# Patient Record
Sex: MALE | Race: OTHER | HISPANIC OR LATINO
[De-identification: names, ages, dates, MRNs, and addresses within clinical notes are randomized per-mention and may not be internally consistent; named-entity substitution may affect disease eponyms.]

---

## 2018-04-20 PROBLEM — Z00.00 ENCOUNTER FOR PREVENTIVE HEALTH EXAMINATION: Noted: 2018-04-20

## 2018-04-23 ENCOUNTER — OTHER (OUTPATIENT)
Age: 53
End: 2018-04-23

## 2018-05-03 ENCOUNTER — APPOINTMENT (OUTPATIENT)
Dept: ORTHOPEDIC SURGERY | Facility: CLINIC | Age: 53
End: 2018-05-03

## 2018-05-09 ENCOUNTER — OTHER (OUTPATIENT)
Age: 53
End: 2018-05-09

## 2018-05-18 ENCOUNTER — APPOINTMENT (OUTPATIENT)
Dept: ORTHOPEDIC SURGERY | Facility: CLINIC | Age: 53
End: 2018-05-18
Payer: MEDICAID

## 2018-05-18 VITALS
SYSTOLIC BLOOD PRESSURE: 160 MMHG | HEIGHT: 60 IN | HEART RATE: 72 BPM | DIASTOLIC BLOOD PRESSURE: 91 MMHG | WEIGHT: 150 LBS | BODY MASS INDEX: 29.45 KG/M2

## 2018-05-18 PROCEDURE — 72100 X-RAY EXAM L-S SPINE 2/3 VWS: CPT

## 2018-05-18 PROCEDURE — 99204 OFFICE O/P NEW MOD 45 MIN: CPT

## 2018-05-29 ENCOUNTER — OUTPATIENT (OUTPATIENT)
Dept: OUTPATIENT SERVICES | Facility: HOSPITAL | Age: 53
LOS: 1 days | End: 2018-05-29
Payer: MEDICAID

## 2018-05-29 ENCOUNTER — APPOINTMENT (OUTPATIENT)
Dept: MRI IMAGING | Facility: CLINIC | Age: 53
End: 2018-05-29
Payer: COMMERCIAL

## 2018-05-29 DIAGNOSIS — Z00.8 ENCOUNTER FOR OTHER GENERAL EXAMINATION: ICD-10-CM

## 2018-05-29 PROCEDURE — 72148 MRI LUMBAR SPINE W/O DYE: CPT

## 2018-05-29 PROCEDURE — 72148 MRI LUMBAR SPINE W/O DYE: CPT | Mod: 26

## 2018-06-06 ENCOUNTER — RECORD ABSTRACTING (OUTPATIENT)
Age: 53
End: 2018-06-06

## 2018-06-12 ENCOUNTER — APPOINTMENT (OUTPATIENT)
Dept: ORTHOPEDIC SURGERY | Facility: CLINIC | Age: 53
End: 2018-06-12
Payer: COMMERCIAL

## 2018-06-12 VITALS
SYSTOLIC BLOOD PRESSURE: 157 MMHG | HEIGHT: 60 IN | HEART RATE: 72 BPM | DIASTOLIC BLOOD PRESSURE: 92 MMHG | BODY MASS INDEX: 29.45 KG/M2 | WEIGHT: 150 LBS

## 2018-06-12 DIAGNOSIS — M48.062 SPINAL STENOSIS, LUMBAR REGION WITH NEUROGENIC CLAUDICATION: ICD-10-CM

## 2018-06-12 DIAGNOSIS — M47.816 SPONDYLOSIS W/OUT MYELOPATHY OR RADICULOPATHY, LUMBAR REGION: ICD-10-CM

## 2018-06-12 DIAGNOSIS — Z78.9 OTHER SPECIFIED HEALTH STATUS: ICD-10-CM

## 2018-06-12 DIAGNOSIS — M43.16 SPONDYLOLISTHESIS, LUMBAR REGION: ICD-10-CM

## 2018-06-12 DIAGNOSIS — F17.200 NICOTINE DEPENDENCE, UNSPECIFIED, UNCOMPLICATED: ICD-10-CM

## 2018-06-12 DIAGNOSIS — F17.210 NICOTINE DEPENDENCE, CIGARETTES, UNCOMPLICATED: ICD-10-CM

## 2018-06-12 DIAGNOSIS — Z82.61 FAMILY HISTORY OF ARTHRITIS: ICD-10-CM

## 2018-06-12 PROCEDURE — 99214 OFFICE O/P EST MOD 30 MIN: CPT

## 2018-06-18 ENCOUNTER — OUTPATIENT (OUTPATIENT)
Dept: OUTPATIENT SERVICES | Facility: HOSPITAL | Age: 53
LOS: 1 days | End: 2018-06-18
Payer: MEDICAID

## 2018-06-18 DIAGNOSIS — M43.16 SPONDYLOLISTHESIS, LUMBAR REGION: ICD-10-CM

## 2018-06-18 DIAGNOSIS — Z51.89 ENCOUNTER FOR OTHER SPECIFIED AFTERCARE: ICD-10-CM

## 2018-06-18 DIAGNOSIS — M54.5 LOW BACK PAIN: ICD-10-CM

## 2018-06-18 DIAGNOSIS — M47.816 SPONDYLOSIS WITHOUT MYELOPATHY OR RADICULOPATHY, LUMBAR REGION: ICD-10-CM

## 2018-06-26 PROBLEM — F17.210 SMOKES 1 PACK OF CIGARETTES PER DAY: Status: ACTIVE | Noted: 2018-06-06

## 2018-06-26 PROBLEM — Z78.9 DOES NOT USE ILLICIT DRUGS: Status: ACTIVE | Noted: 2018-06-06

## 2018-06-26 PROBLEM — Z78.9 NEVER EXERCISES: Status: ACTIVE | Noted: 2018-06-06

## 2018-06-26 PROBLEM — Z78.9 REGULAR DRINKER OF ALCOHOL: Status: ACTIVE | Noted: 2018-06-06

## 2018-06-26 PROBLEM — Z82.61 FAMILY HISTORY OF ARTHRITIS: Status: ACTIVE | Noted: 2018-06-06

## 2018-06-26 PROBLEM — F17.200 CURRENT EVERY DAY SMOKER: Status: ACTIVE | Noted: 2018-06-06

## 2018-07-17 PROCEDURE — 97110 THERAPEUTIC EXERCISES: CPT

## 2018-07-17 PROCEDURE — 97163 PT EVAL HIGH COMPLEX 45 MIN: CPT

## 2018-07-17 PROCEDURE — 97010 HOT OR COLD PACKS THERAPY: CPT

## 2018-07-17 PROCEDURE — 97140 MANUAL THERAPY 1/> REGIONS: CPT

## 2018-08-28 ENCOUNTER — APPOINTMENT (OUTPATIENT)
Dept: NEUROLOGY | Facility: CLINIC | Age: 53
End: 2018-08-28

## 2018-09-07 ENCOUNTER — OTHER (OUTPATIENT)
Age: 53
End: 2018-09-07

## 2020-04-11 ENCOUNTER — EMERGENCY (EMERGENCY)
Facility: HOSPITAL | Age: 55
LOS: 1 days | Discharge: HOME | End: 2020-04-11
Attending: EMERGENCY MEDICINE
Payer: MEDICAID

## 2020-04-11 VITALS
HEIGHT: 60 IN | OXYGEN SATURATION: 98 % | DIASTOLIC BLOOD PRESSURE: 80 MMHG | RESPIRATION RATE: 20 BRPM | WEIGHT: 154.98 LBS | HEART RATE: 110 BPM | SYSTOLIC BLOOD PRESSURE: 137 MMHG | TEMPERATURE: 98 F

## 2020-04-11 VITALS
HEART RATE: 102 BPM | DIASTOLIC BLOOD PRESSURE: 95 MMHG | SYSTOLIC BLOOD PRESSURE: 142 MMHG | OXYGEN SATURATION: 96 % | RESPIRATION RATE: 20 BRPM | TEMPERATURE: 99 F

## 2020-04-11 LAB — SARS-COV-2 RNA SPEC QL NAA+PROBE: DETECTED

## 2020-04-11 PROCEDURE — 82962 GLUCOSE BLOOD TEST: CPT

## 2020-04-11 PROCEDURE — 99284 EMERGENCY DEPT VISIT MOD MDM: CPT

## 2020-04-11 PROCEDURE — 93010 ELECTROCARDIOGRAM REPORT: CPT

## 2020-04-11 PROCEDURE — 87635 SARS-COV-2 COVID-19 AMP PRB: CPT

## 2020-04-11 PROCEDURE — 93005 ELECTROCARDIOGRAM TRACING: CPT

## 2020-04-11 RX ORDER — AMLODIPINE BESYLATE 2.5 MG/1
1 TABLET ORAL
Qty: 30 | Refills: 0
Start: 2020-04-11 | End: 2020-05-10

## 2020-04-11 RX ORDER — ATORVASTATIN CALCIUM 80 MG/1
10 TABLET, FILM COATED ORAL AT BEDTIME
Refills: 0 | Status: DISCONTINUED | OUTPATIENT
Start: 2020-04-11 | End: 2020-04-16

## 2020-04-11 RX ORDER — METFORMIN HYDROCHLORIDE 850 MG/1
500 TABLET ORAL
Refills: 0 | Status: DISCONTINUED | OUTPATIENT
Start: 2020-04-11 | End: 2020-04-16

## 2020-04-11 RX ORDER — NICOTINE POLACRILEX 2 MG
1 GUM BUCCAL DAILY
Refills: 0 | Status: DISCONTINUED | OUTPATIENT
Start: 2020-04-11 | End: 2020-04-16

## 2020-04-11 RX ORDER — ATORVASTATIN CALCIUM 80 MG/1
1 TABLET, FILM COATED ORAL
Qty: 30 | Refills: 0
Start: 2020-04-11 | End: 2020-05-10

## 2020-04-11 RX ORDER — AMLODIPINE BESYLATE 2.5 MG/1
5 TABLET ORAL DAILY
Refills: 0 | Status: DISCONTINUED | OUTPATIENT
Start: 2020-04-11 | End: 2020-04-16

## 2020-04-11 RX ORDER — METFORMIN HYDROCHLORIDE 850 MG/1
1 TABLET ORAL
Qty: 60 | Refills: 0
Start: 2020-04-11 | End: 2020-05-10

## 2020-04-11 RX ORDER — ACETAMINOPHEN 500 MG
650 TABLET ORAL EVERY 6 HOURS
Refills: 0 | Status: DISCONTINUED | OUTPATIENT
Start: 2020-04-11 | End: 2020-04-16

## 2020-04-11 RX ORDER — ACETAMINOPHEN 500 MG
2 TABLET ORAL
Qty: 40 | Refills: 0
Start: 2020-04-11 | End: 2020-04-15

## 2020-04-11 RX ADMIN — Medication 650 MILLIGRAM(S): at 20:43

## 2020-04-11 RX ADMIN — ATORVASTATIN CALCIUM 10 MILLIGRAM(S): 80 TABLET, FILM COATED ORAL at 22:19

## 2020-04-11 RX ADMIN — Medication 1 PATCH: at 22:20

## 2020-04-11 NOTE — ED ADULT NURSE NOTE - NSIMPLEMENTINTERV_GEN_ALL_ED
Implemented All Universal Safety Interventions:  [place] to call system. Call bell, personal items and telephone within reach. Instruct patient to call for assistance. Room bathroom lighting operational. Non-slip footwear when patient is off stretcher. Physically safe environment: no spills, clutter or unnecessary equipment. Stretcher in lowest position, wheels locked, appropriate side rails in place.

## 2020-04-11 NOTE — ED CDU PROVIDER INITIAL DAY NOTE - MEDICAL DECISION MAKING DETAILS
male recently incarcerated + covid no place for stay.   placed into observation for social work for placement

## 2020-04-11 NOTE — ED STATDOCS - ATTENDING CONTRIBUTION TO CARE
I, Bety Oquendo, performed the initial face to face bedside interview with this patient regarding history of present illness, review of symptoms and relevant past medical, social and family history.  I completed an independent physical examination.  I was the initial provider who evaluated this patient. I have signed out the follow up of any pending tests (i.e. labs, radiological studies) to the ACP.  I have communicated the patient’s plan of care and disposition with the ACP.

## 2020-04-11 NOTE — ED ADULT NURSE REASSESSMENT NOTE - NS ED NURSE REASSESS COMMENT FT1
received pt from previous Rn Prakash Richardson.  pt moved to A2- for isolation. temp-101.2, no apparent distress noted. MD Bety Oquendo made aware and per MD will place medication orders.

## 2020-04-11 NOTE — ED ADULT NURSE NOTE - OBJECTIVE STATEMENT
55 years old male presents to ED for medical evaluation. Patient recently released from [facility]. States tested for covid 1 week ago, didn't receive results of the test. Patient noted with intermittent cough for few weeks. Denies any other symptoms. States since release patient have been living in box truck, no heat. States able to live with family if covid test comes back negative. Compliant with meds while incarcerated. Patient A&Ox4. no s/s of acute distress. Patient pending covid swab results and SW for possible placement.

## 2020-04-11 NOTE — ED STATDOCS - CLINICAL SUMMARY MEDICAL DECISION MAKING FREE TEXT BOX
Will send COVID swab. Social work contacted. Will refill medications, check finger stick Will send COVID swab. Social work contacted. Will refill medications, check finger stick--if covid neg will dc as notes that has family to go to if covid + will need to be placed in a shelter; social work contacted

## 2020-04-11 NOTE — ED CDU PROVIDER INITIAL DAY NOTE - OBJECTIVE STATEMENT
56 yo male hx of htn niddm recently incarcerated for several months released about 4 days ago found to be covid +, seeking shelter since place where pt was staying is refusing to take him back. denies complaints at this time. compliant with all medications prescribed. denies fevers chills + cough - cp -sob - GI symptoms   smoker

## 2020-04-11 NOTE — ED STATDOCS - OBJECTIVE STATEMENT
54 y/o M pt with significant PMHx of HTN, DM and HLD presents to the ED c/o medication refill and housing. He reports that he was recently discharged from corrections 4 days ago, and is currently seeking shelter. Patient states that he may possibly be positive for COVID. He also recalls that he had a cough several days ago, but it has subsided. Patient currently has no symptoms. Denies f/c/n/v/cp/sob/palpitations/ cough/rash/headache/dizziness/abd.pain/d/c/dysuria/hematuria No further acute complaints at this time. 56 y/o M pt with significant PMHx of HTN, DM and HLD presents to the ED c/o medication refill and housing. He reports that he was recently discharged from corrections 4 days ago, and is currently seeking shelter. Patient states that he may possibly be positive for COVID was tested at [facility]. but doesn't know the results. He also recalls that he had a cough several days ago, but it has subsided. Patient currently has no symptoms. Denies f/c/n/v/cp/sob/palpitations/ cough/rash/headache/dizziness/abd.pain/d/c/dysuria/hematuria No further acute complaints at this time.

## 2020-04-11 NOTE — CHART NOTE - NSCHARTNOTEFT_GEN_A_CORE
18:55SWNote: p/c from pt's EDMD(Aniyah) to inform worker that pt is +Covid and informed her that he is homeless. Reportedly, pt was staying at friend's apartment ,pt was incarcerated seven months due to DWI released this Wednesday. States that he went to the friend's apartment and he didn't allow him to enter because he was experiencing Covid symptoms (coughing and chills) ,pt slept outside next to a trailer truck these past two nights. Pt +Covid (tested at correction office last Tuesday).   Worker called DSS after hours(Don) to request shelter for pt, per Alexander pt shows he lives in [place] and receives SNAP at same address.Per Alexander, pt has to return to that residence. Worker explained pt informed his friends are not allowing him in, there are children and other adults in the apartment. Per Alexander, pt can call the police if he does not have an eviction order he has to be able to get in. Worker informed Dr Jere MD spoke with DSS representative  Don encouraged him to understand pt has been away for 7 months (incarcerated) friends are not obligated to expose the family to Covid. Per Alexander he will discuss case with his administration ,worker to call back at 22:30.   21:17 SWNote-p/c from Alexander to request info about pt's living conditions (# of people living in the house,# of rooms,bathrooms,cleaning supplies,first day of presenting symptoms, date of + results, sex offender or not). Worker met with pt . Pt states its an apartment not house,he was staying in small living room, two bedrooms ,one bathroom, 3 adults,5 children,no sex offender, pt unsure about cleaning supplies has not been in the apartment for 7 months. Pt was tested last Tuesday and he started to experience cough around two weeks ago.  Alexander will discuss case with his , worker will be called. NOREEN to follow.

## 2020-04-11 NOTE — ED ADULT NURSE REASSESSMENT NOTE - NS ED NURSE REASSESS COMMENT FT1
pt stated " I can't take Tylenol/ Motrin  because someone told me I have cholesterol problems, I do not exactly remember why I can't take it." denies any allergies to mediation. MD Oquendo made aware and per MD will go talk with pt.

## 2020-04-11 NOTE — ED ADULT TRIAGE NOTE - CHIEF COMPLAINT QUOTE
c/o wants his sugar checked, ran out of meds. also positive  for corona virus  , was released from shelter 4   days ago

## 2020-04-11 NOTE — ED STATDOCS - PROGRESS NOTE DETAILS
will place in obs for shelter placement; pt incarcerated for 7 months from september up until this wednesday - got COVID while incarcerated now has no place to go ; social work trying to get shelter

## 2020-04-11 NOTE — ED STATDOCS - PHYSICAL EXAMINATION
Head: atraumatic, normacephalic  Face: atraumatic, no crepitus no orbiral/maxillary/mandibular ttp  throat: uvula midline no exudates  eyes: perrla eomi  heart: rrr s1s2  lungs: ctab  abd: soft, nt nd +bs no rebound/guarding no cva ttp  skin: warm  LE: no swelling, no calf ttp  back: no midline cervical/thoracic/lumbar ttp Head: atraumatic, normacephalic  eyes: perrla eomi  heart: rrr s1s2  lungs: ctab  abd: soft, nt nd +bs no rebound/guarding no cva ttp  skin: warm  LE: no swelling, no calf ttp  back: no midline cervical/thoracic/lumbar ttp

## 2020-04-11 NOTE — ED STATDOCS - PATIENT PORTAL LINK FT
You can access the FollowMyHealth Patient Portal offered by [facility] by registering at the following website: [email or web address]. By joining [employer]’s FollowMyHealth portal, you will also be able to view your health information using other applications (apps) compatible with our system.

## 2020-04-11 NOTE — ED ADULT NURSE NOTE - CHIEF COMPLAINT QUOTE
c/o wants his sugar checked, ran out of meds. also positive  for corona virus  , was [facility] 4   days ago

## 2020-04-27 ENCOUNTER — EMERGENCY (EMERGENCY)
Facility: HOSPITAL | Age: 55
LOS: 1 days | Discharge: HOME | End: 2020-04-27
Attending: EMERGENCY MEDICINE
Payer: MEDICAID

## 2020-04-27 VITALS
HEIGHT: 60 IN | RESPIRATION RATE: 20 BRPM | HEART RATE: 94 BPM | OXYGEN SATURATION: 98 % | WEIGHT: 149.91 LBS | SYSTOLIC BLOOD PRESSURE: 132 MMHG | TEMPERATURE: 98 F | DIASTOLIC BLOOD PRESSURE: 81 MMHG

## 2020-04-27 PROCEDURE — 99218: CPT

## 2020-04-27 NOTE — ED PROVIDER NOTE - OBJECTIVE STATEMENT
Pt is a 55y M with PMH of DM/ HTN/ HLD presenting to speak with SW for housing. Pt states he was here 2 weeks ago and tested positive for Covid. He was given housing at a motel for 14 days while he was sick. Today his 14 days were up and he was checked out. He now has no where to go unless he tests negative for covid. Pt denies any symptoms (SOB/fever/cough). No other complaints.

## 2020-04-27 NOTE — ED PROVIDER NOTE - PROGRESS NOTE DETAILS
Will have stat covid test due to change in dispo depending on results.  spoke with supervisor and cannot get expedited covid. SW stating they cannot get housing until the morning anyway and p[t needs covid results. Will place in obs for SW f/u and covid results.

## 2020-04-27 NOTE — CHART NOTE - NSCHARTNOTEFT_GEN_A_CORE
SW Note: SW alerted by RN that pt was staying at [facility] due to being covid + for the last 2 weeks and now needs housing again. SW reviewed previous chart notes, Pt was d/c on 4/11 to [facility] via [facility]. SW met with pt at bedside to assess further. Pt reports his 2 week stay at [facility] via DSS has come to an end and he needs housing again. SW inquired about pt's primary residence and why he cannot return there. Pt reports there are other people that live there, and he is only welcomed back once he is covid negative. Pt reports he sleeps in the living room and shares the bathroom and kitchen with the other people in the house. Pt denied receiving any notices of eviction. SW inquired about pt having private funds for a hotel, which pt denied. SW explained to pt that DSS will make decision to house again or not. Pt's Covid swab pending, SW will follow for d/c to residence/DSS housing pending covid results. PA made aware of above SW Note: SW alerted by RN that pt was staying at [facility] due to being covid + for the last 2 weeks and now needs housing again. SW reviewed previous chart notes, Pt was d/c on 4/11 to [facility] via [facility]. SW met with pt at bedside to assess further. Pt reports his 2 week stay at [facility] via DSS has come to an end and he needs housing again. SW inquired about pt's primary residence and why he cannot return there. Pt was incarcerated and has not been to the residence in 7 months. Pt reports there are other people that live there, and he is only welcomed back once he is covid negative. Pt reports he sleeps in the living room and shares the bathroom and kitchen with the other people in the house. Pt denied receiving any notices of eviction. SW inquired about pt having private funds for a hotel, which pt denied. SW explained to pt that DSS will make decision to house again or not. Pt's Covid swab pending, SW will follow for d/c to residence/DSS housing pending covid results. PA made aware of above

## 2020-04-27 NOTE — ED ADULT NURSE NOTE - OBJECTIVE STATEMENT
pt A&Ox4 in NAD. respirations even and unlabored. moving all extremities. pt presents to ED due to losing housing that was provided due to previous + covid test. pt now requiring repeat covid test for continued placement. pt not reporting any sx at this time. pt resting comfortably.

## 2020-04-27 NOTE — ED PROVIDER NOTE - CLINICAL SUMMARY MEDICAL DECISION MAKING FREE TEXT BOX
55Y M with PMH of DM/HTN/HLD presenting for house after being tested + for covid 2 weeks ago. Pt has housing if covid negative. Will re-test for covid and have SW see pt.

## 2020-04-27 NOTE — ED ADULT NURSE NOTE - NSIMPLEMENTINTERV_GEN_ALL_ED
Implemented All Universal Safety Interventions:  [place] to call system. Call bell, personal items and telephone within reach. Instruct patient to call for assistance. Room bathroom lighting operational. Non-slip footwear when patient is off stretcher. Physically safe environment: no spills, clutter or unnecessary equipment. Stretcher in lowest position, wheels locked, appropriate side rails in place.

## 2020-04-27 NOTE — ED ADULT NURSE NOTE - CHPI ED NUR SYMPTOMS NEG
no wheezing/no body aches/no chest pain/no chills/no shortness of breath/no cough/no diaphoresis/no hemoptysis/no edema/no fever/no headache

## 2020-04-27 NOTE — ED CDU PROVIDER INITIAL DAY NOTE - OBJECTIVE STATEMENT
Pt is a 55Y M with DM HTN HLD presenting to speak with SW for housing s/p + covid test. Pt was seen 2 weeks ago and set up in [facility] pt was + for covid. He was kicked out today after 2 weeks and pt has nowhere to go. He was seen by SW but they need a new covid test to determine where to find housing. Pt denies any fever/SOB/chills/cough. New covid test pending. SW will follow.

## 2020-04-27 NOTE — ED ADULT NURSE NOTE - CHIEF COMPLAINT QUOTE
Pt seen in ED 2 weeks ago and tested + for covid, SW had set up shelter at a mot for pt to quarantine for past 2 weeks, pt told to leave motel today and was told [facility] is unable to help him, NOREEN aware

## 2020-04-27 NOTE — ED ADULT TRIAGE NOTE - CHIEF COMPLAINT QUOTE
Pt seen in ED 2 weeks ago and tested + for covid, SW had set up shelter at a mot for pt to quarantine for past 2 weeks, pt told to leave motel today and was told [facility] is unable to help him, NOREEN aware

## 2020-04-28 VITALS
HEART RATE: 73 BPM | TEMPERATURE: 98 F | DIASTOLIC BLOOD PRESSURE: 80 MMHG | OXYGEN SATURATION: 98 % | SYSTOLIC BLOOD PRESSURE: 123 MMHG | RESPIRATION RATE: 18 BRPM

## 2020-04-28 PROBLEM — E11.9 TYPE 2 DIABETES MELLITUS WITHOUT COMPLICATIONS: Chronic | Status: ACTIVE | Noted: 2020-04-11

## 2020-04-28 LAB
GLUCOSE BLDC GLUCOMTR-MCNC: 212 MG/DL — HIGH (ref 70–99)
SARS-COV-2 RNA SPEC QL NAA+PROBE: DETECTED
SARS-COV-2 RNA SPEC QL NAA+PROBE: DETECTED

## 2020-04-28 PROCEDURE — 87635 SARS-COV-2 COVID-19 AMP PRB: CPT

## 2020-04-28 PROCEDURE — G0378: CPT

## 2020-04-28 PROCEDURE — 99284 EMERGENCY DEPT VISIT MOD MDM: CPT | Mod: 25

## 2020-04-28 PROCEDURE — 82962 GLUCOSE BLOOD TEST: CPT

## 2020-04-28 PROCEDURE — 99217: CPT

## 2020-04-28 NOTE — CHART NOTE - NSCHARTNOTEFT_GEN_A_CORE
SOCIAL WORK NOTE:  AMBULANCE ARRANGE FOR 4 PM. PATIENT MADE AWARE AND PROVIDED PATIENT WITH HOUSING CONFIRMATION FORM.  PATIENT EXPRESSED UNDERSTANDING. ED STAFF AWARE.

## 2020-04-28 NOTE — CHART NOTE - NSCHARTNOTEFT_GEN_A_CORE
SOCIAL WORK NOTE: DISCUSSED WITH [facility] REGARDING PATIENT.  THEY REPORTED THAT HE WAS CLEARED BY THE [employer]IAGO TO NO LONGER NEED ISOLATION. THEY REPORTED HE IS INELIGIBLE FOR HOUSING UNLESS HE RESULTS AS POSITIVE AGAIN.  THIS WORKER SENT THE 2 NEW POSITIVE RESULTS TO [facility]  AND WAS ABLE TO ACQUIRE PLACMENT FOR THE PATIENT AT [facility] LOCATED AT [address] [place] [address]. PATIENT NEEDS TO REPORT DIRECTLY TO ROOM 11 AS DOOR WILL BE OPEN.  PLACEMENT PAPERWORK PROVIDED TO PATIENT AND AMBULANCE TO BE ARRANGED TO TRANSFER PATIENT TO [facility].

## 2020-04-28 NOTE — ED CDU PROVIDER DISPOSITION NOTE - ATTENDING CONTRIBUTION TO CARE
Pt. well appearing. SW found pt. housing. Pt. COVID+19.  Pt. will be discharged from the ED. I, Dr. Shepard, performed a face to face bedside interview with this patient regarding history of present illness, review of symptoms and relevant past medical, social and family history.  I completed an independent physical examination.  I have also reviewed the ACP's note(s) and discussed the plan with the ACP.

## 2020-04-28 NOTE — DISCHARGE NOTE NURSING/CASE MANAGEMENT/SOCIAL WORK - PATIENT PORTAL LINK FT
You can access the FollowMyHealth Patient Portal offered by [facility] by registering at the following website: [email or web address]. By joining [email or web address]’s FollowMyHealth portal, you will also be able to view your health information using other applications (apps) compatible with our system.

## 2020-04-28 NOTE — ED CDU PROVIDER SUBSEQUENT DAY NOTE - ATTENDING CONTRIBUTION TO CARE
recently incarcerated male whom tested + for covid. requiring new housing situation. Pt. stable appearing. Pt. in no distress.. Pt. with normal and steady gait. I, Dr. Shepard, performed a face to face bedside interview with this patient regarding history of present illness, review of symptoms and relevant past medical, social and family history.  I completed an independent physical examination.  I have also reviewed the ACP's note(s) and discussed the plan with the ACP.

## 2020-04-28 NOTE — ED CDU PROVIDER DISPOSITION NOTE - CLINICAL COURSE
This is a 55 year old male with c/o being homeless, was evaluated in ED x 2 weeks ago, and tested +covid, patient reports was evaluated by SW and CM and given medication and follow up with [facility] clinic.  Patient needed repeating testing of covid 19 to determine patient being cohorted.  Patient still covid positive, SW arranged housing in motel.  Stable for discharge home.  Given copies of all results, understands and agrees to proceed.

## 2020-04-28 NOTE — ED CDU PROVIDER DISPOSITION NOTE - PATIENT PORTAL LINK FT
You can access the FollowMyHealth Patient Portal offered by [facility] by registering at the following website: [email or web address]. By joining [email or web address]’s FollowMyHealth portal, you will also be able to view your health information using other applications (apps) compatible with our system.

## 2020-04-28 NOTE — DISCHARGE NOTE NURSING/CASE MANAGEMENT/SOCIAL WORK - NSDCFUADDAPPT_GEN_ALL_CORE_FT
FUNMI CARMEN   5/5 @ 12:30 with Maxine Lisa  [address] ...bring d/c note for follow up MD

## 2020-04-28 NOTE — ED ADULT NURSE REASSESSMENT NOTE - NS ED NURSE REASSESS COMMENT FT1
[place] refused to take pt belongings and per NOREEN sending ambulette with room to accommodate pt belongings. New ride to arrive by 1915. Pt waiting in A 1 [place].
pt moved to A12. Settled in bed with blanket, lights dimmed, TV on. Pt has belongings with him. No other needs at this time.
pt sleeping, awoken for vitals, then went back to sleep, pt stable and comfort measures continue
vitals rechecked, lights remained dimmed and door shut for quiet environment, pt has no other needs at this time
Late Note  Report received at change of shift from outgoing RN and assumed care of pt at that time. Pt received sitting at bedside, awake, alert, no acute distress, no complaints. Pt given breakfast tray and ate with good appetite. Pt ambulated steadily to bathroom without assist. Pt vss, fall precautions in place, will monitor.

## 2020-04-28 NOTE — PROVIDER CONTACT NOTE (OTHER) - ACTION/TREATMENT ORDERED:
[facility] clinic appt made since pt hasn't seen his new PCP yet. Placed appt on D/C notes.

## 2020-04-28 NOTE — CHART NOTE - NSCHARTNOTEFT_GEN_A_CORE
SOCIAL WORK NOTE:  MET WITH PATIENT THIS MORNING AFTER REVIEWING CHART. PATIENT STILL AWAITING COVID UPDATED COVID RESULTS AS NEW RESULTS NEEDED FORM THE 4/11 RESULTS FOR ADDITIONAL PLACEMENT.  PATIENT REPORTED THAT HE DID CALL THE # [phone] NUMBER AS HE WAS DIRECTED TO IN THE MORNING. THIS WORKER REACHED OUT TO HOUSING DEPARTMENT. WILL NEED TO COMPLETE THE COVID TEMPLATE AND SEND ALONG WITH COVID RESULTS IN ORDER FOR PATIENT TO QUALIFY FOR HOUSING. SW WILL CONTINEU TO FOLLOW.

## 2020-05-12 ENCOUNTER — EMERGENCY (EMERGENCY)
Facility: HOSPITAL | Age: 55
LOS: 1 days | Discharge: HOME | End: 2020-05-12
Attending: EMERGENCY MEDICINE
Payer: MEDICAID

## 2020-05-12 VITALS
SYSTOLIC BLOOD PRESSURE: 123 MMHG | HEART RATE: 93 BPM | RESPIRATION RATE: 20 BRPM | DIASTOLIC BLOOD PRESSURE: 81 MMHG | HEIGHT: 60 IN | TEMPERATURE: 98 F | WEIGHT: 149.91 LBS | OXYGEN SATURATION: 99 %

## 2020-05-12 LAB — GLUCOSE BLDC GLUCOMTR-MCNC: 277 MG/DL — HIGH (ref 70–99)

## 2020-05-12 PROCEDURE — 99218: CPT

## 2020-05-12 RX ORDER — METFORMIN HYDROCHLORIDE 850 MG/1
500 TABLET ORAL
Refills: 0 | Status: DISCONTINUED | OUTPATIENT
Start: 2020-05-12 | End: 2020-05-17

## 2020-05-12 RX ORDER — AMLODIPINE BESYLATE 2.5 MG/1
5 TABLET ORAL DAILY
Refills: 0 | Status: DISCONTINUED | OUTPATIENT
Start: 2020-05-12 | End: 2020-05-17

## 2020-05-12 RX ORDER — ATORVASTATIN CALCIUM 80 MG/1
10 TABLET, FILM COATED ORAL AT BEDTIME
Refills: 0 | Status: DISCONTINUED | OUTPATIENT
Start: 2020-05-12 | End: 2020-05-17

## 2020-05-12 RX ADMIN — ATORVASTATIN CALCIUM 10 MILLIGRAM(S): 80 TABLET, FILM COATED ORAL at 23:14

## 2020-05-12 RX ADMIN — METFORMIN HYDROCHLORIDE 500 MILLIGRAM(S): 850 TABLET ORAL at 19:00

## 2020-05-12 NOTE — ED CDU PROVIDER INITIAL DAY NOTE - DETAILS
55 year old male recently incarcerated, currently homeless, COVID swab ordered. CM and SW for placement.

## 2020-05-12 NOTE — ED ADULT NURSE REASSESSMENT NOTE - NS ED NURSE REASSESS COMMENT FT1
Patient resting in stretcher, VSS at this time. No s/s of apparent distress noted. Denies pain or discomfort when asked. Will continue to monitor.

## 2020-05-12 NOTE — ED PROVIDER NOTE - OBJECTIVE STATEMENT
55 year old male with PMhx DM, HTN, HLD presents to the ED for housing placement. Pt reports he was incarcerated until the beginning of April and was then homeless. He then developed sx of Corona Virus and tested positive. Pt has been staying in a motel provided by [facility]. Denies any current complaints, cough, SOB, fever, chills, abd pain, CP, N/V/D. States he needs a COVID swab so he can be placed.

## 2020-05-12 NOTE — ED CDU PROVIDER INITIAL DAY NOTE - FAMILY HISTORY
No pertinent family history in first degree relatives Mother  Still living? Unknown  Family history of hypertension, Age at diagnosis: Age Unknown  Family history of diabetes mellitus, Age at diagnosis: Age Unknown

## 2020-05-12 NOTE — ED CDU PROVIDER INITIAL DAY NOTE - ATTENDING CONTRIBUTION TO CARE
56yo male with DM, HTN, hyperlipidemia presenting to ED to obtain help with housing. Patient recently incarcerated, COVID+ 2 weeks ago but symptoms have mostly recovered since then. Patient states he has a cough but denies fevers/chills/nausea/vomiting/diarrhea. Patient states he needs COVID swab for placement. Will obtain swab, SW aware, likely dc in AM. Soha Bowen DO     I performed a history and physical exam of the patient and discussed their management with the advanced care provider. I reviewed the advanced care provider's note and agree with the documented findings and plan of care. My medical decision making and objective findings are found above.

## 2020-05-12 NOTE — ED ADULT NURSE NOTE - OBJECTIVE STATEMENT
Assumed care at 1633 pt states he was told he needs to be tested for COVID-19 to get placement, pt was positive on 4.27.20, today denies any SOB, couch, fevers, chills. pt has chronic back pain, shoulder pain and neck. Pt took his BP and DM medications today, he is unsure the names of the medications he took.

## 2020-05-12 NOTE — ED ADULT NURSE NOTE - CAS ELECT INFOMATION PROVIDED
DC instructions provided to the patient. Patient in understanding of all dc instructions. N further questions for the RN regarding dc instructions. VSS. Ambulatory. Pending SW for medicaid cab./DC instructions

## 2020-05-12 NOTE — ED ADULT TRIAGE NOTE - CHIEF COMPLAINT QUOTE
Patient arrived to ED today with need for COVID swab to be preformed.  Patient states that  needs a test from him for housing placement.  Patient was COVID positive in April.

## 2020-05-12 NOTE — ED ADULT NURSE REASSESSMENT NOTE - NS ED NURSE REASSESS COMMENT FT1
Report received from off-going RN at 19:30, VSS, pt resting comfortably in stretcher. No s/s of apparent distress noted. Resp even and unlabored, skin warm and dry. Plan of care reviewed with patient, verbalzies understanding at this time, will continue to monitor.

## 2020-05-12 NOTE — ED PROVIDER NOTE - ATTENDING CONTRIBUTION TO CARE
Patient h/o DM, HTN, recently incarcerated, COVID+ 2 weeks ago but symptoms have mostly recovered since then. Patient states he has a cough but denies fevers/chills/nausea/vomiting/diarrhea. Patient states he needs COVID swab for placement. Will obtain swab, SW aware, likely dc in AM. Soha Bowen DO       I performed a history and physical exam of the patient and discussed their management with the advanced care provider. I reviewed the advanced care provider's note and agree with the documented findings and plan of care. My medical decision making and objective findings are found above.

## 2020-05-13 VITALS
SYSTOLIC BLOOD PRESSURE: 122 MMHG | TEMPERATURE: 98 F | DIASTOLIC BLOOD PRESSURE: 76 MMHG | OXYGEN SATURATION: 98 % | HEART RATE: 75 BPM | RESPIRATION RATE: 16 BRPM

## 2020-05-13 LAB
GLUCOSE BLDC GLUCOMTR-MCNC: 226 MG/DL — HIGH (ref 70–99)
SARS-COV-2 RNA SPEC QL NAA+PROBE: SIGNIFICANT CHANGE UP

## 2020-05-13 PROCEDURE — U0003: CPT

## 2020-05-13 PROCEDURE — 99217: CPT

## 2020-05-13 PROCEDURE — 82962 GLUCOSE BLOOD TEST: CPT

## 2020-05-13 PROCEDURE — 99283 EMERGENCY DEPT VISIT LOW MDM: CPT

## 2020-05-13 PROCEDURE — G0378: CPT

## 2020-05-13 RX ORDER — DEXTROSE 50 % IN WATER 50 %
25 SYRINGE (ML) INTRAVENOUS ONCE
Refills: 0 | Status: DISCONTINUED | OUTPATIENT
Start: 2020-05-13 | End: 2020-05-17

## 2020-05-13 RX ORDER — SODIUM CHLORIDE 9 MG/ML
1000 INJECTION, SOLUTION INTRAVENOUS
Refills: 0 | Status: DISCONTINUED | OUTPATIENT
Start: 2020-05-13 | End: 2020-05-17

## 2020-05-13 RX ORDER — DEXTROSE 50 % IN WATER 50 %
12.5 SYRINGE (ML) INTRAVENOUS ONCE
Refills: 0 | Status: DISCONTINUED | OUTPATIENT
Start: 2020-05-13 | End: 2020-05-17

## 2020-05-13 RX ORDER — DEXTROSE 50 % IN WATER 50 %
15 SYRINGE (ML) INTRAVENOUS ONCE
Refills: 0 | Status: DISCONTINUED | OUTPATIENT
Start: 2020-05-13 | End: 2020-05-17

## 2020-05-13 RX ORDER — GLUCAGON INJECTION, SOLUTION 0.5 MG/.1ML
1 INJECTION, SOLUTION SUBCUTANEOUS ONCE
Refills: 0 | Status: DISCONTINUED | OUTPATIENT
Start: 2020-05-13 | End: 2020-05-17

## 2020-05-13 RX ADMIN — AMLODIPINE BESYLATE 5 MILLIGRAM(S): 2.5 TABLET ORAL at 06:15

## 2020-05-13 RX ADMIN — METFORMIN HYDROCHLORIDE 500 MILLIGRAM(S): 850 TABLET ORAL at 06:15

## 2020-05-13 NOTE — ED CDU PROVIDER DISPOSITION NOTE - CLINICAL COURSE
55 year old male with PMhx DM, HTN, HLD presents to the ED for housing placement. Pt reports he was incarcerated until the beginning of April and was then homeless. He then developed sx of Corona Virus and tested positive. Pt has been staying in a motel provided by [facility]. Denies any current complaints, cough, SOB, fever, chills, abd pain, CP, N/V/D. States he needs a COVID swab so he can be placed. Pt placed in observation for placement. COVID swab is negative. SW to arrange for housing.

## 2020-05-13 NOTE — ED CDU PROVIDER DISPOSITION NOTE - NSFOLLOWUPINSTRUCTIONS_ED_ALL_ED_FT
- Please follow up with your Primary Care Doctor in 24-48 hr.  - Seek immediate medical care for any new, worsening or concerning signs or symptoms.   - You were given copies of all your test results, please bring to your primary care doctor for review    Feel better!     Viral Respiratory Infection    A viral respiratory infection is an illness that affects parts of the body used for breathing, like the lungs, nose, and throat. It is caused by a germ called a virus. Symptoms can include runny nose, coughing, sneezing, fatigue, body aches, sore throat, fever, or headache. Over the counter medicine can be used to manage the symptoms but the infection typically goes away on its own in 5 to 10 days.     SEEK IMMEDIATE MEDICAL CARE IF YOU HAVE ANY OF THE FOLLOWING SYMPTOMS: shortness of breath, chest pain, fever over 10 days, or lightheadedness/dizziness.

## 2020-05-13 NOTE — CHART NOTE - NSCHARTNOTEFT_GEN_A_CORE
SOCIAL WORK NOTE:  PATIENT MET THIS WORKER WITH SOME RESISTANCE UPON EXPLANATION THAT DSS WILL NO LONGER CONTINUE TO HOUSE AND PATIENT NEEDING TO RETURN TO PRIOR RESIDENCE. PATIENT NOT FEELING COMFORTABLE WHETHER THEY WILL ACCEPT HIM BACK AT THAT HOME OR NOT.  DSS ALSO STATED THEY EXPLAINED THIS TO THE PATIENT SEVERAL TIMES.  JAROD PATIENT AWARE THAT HE WILL NEED TO GO THERE AND IF HE MEETS RESISTANCE, HE WILL THEN NEED TO PROVIDE DSS PROOF OF SAME.  PATIENT IN AGREEMENT TO TAKE MEDICAID TAXI BACK TO [address] AND REQUESTED A 12 NOON PICKUP TIME.  MADE PA AWARE OF ABOVE AND SHE WILL PROVIDE PATIENT WITH DC APPEARS THAT REPORTS HIS COVID RESULTS AS PATIENT HAD CONCERNS REGARDING ACCESSING THEM IF NEEDED.

## 2020-05-13 NOTE — CHART NOTE - NSCHARTNOTEFT_GEN_A_CORE
SOCIAL WORK NOTE: DISCUSSED CASE WITH [facility] DSS. THEY ARE FAMILIAR WITH HIS CASE.  MADE THEM AWARE THAT PATIENT IS COVID NEGATIVE.  THEY WILL NO LONGER BE ABLE TO HOUSE THE PATIENT WITH COVID NEGATIVE RESULTS AS HE HAS A DOCUMENTED PERMANENT [place]ENCE [place] [address]. MADE OBSERVATION TEAM AWARE TO COMPLETE DISCHARGE AND MADE PATIETN AWARE DSS WILL NOT HOUSE AND TO RETURN TO PREVIOUS ADDRESS.

## 2020-05-13 NOTE — ED CDU PROVIDER SUBSEQUENT DAY NOTE - ATTENDING CONTRIBUTION TO CARE
I, Ludwig Paniagua, performed a face to face bedside interview with this patient regarding history of present illness, review of symptoms and relevant past medical, social and family history.  I completed an independent physical examination. I have communicated the patient’s plan of care and disposition with the ACP.      no complaints overnight;  pt await placement in am by social work ; pe awake alert obese heent ncat neck supple cor s1 s2 lungs clear abd soft nontender neuro nonfocal ;dx dm , placement;

## 2020-05-13 NOTE — ED ADULT NURSE REASSESSMENT NOTE - NS ED NURSE REASSESS COMMENT FT1
assumed care of the patient at 0730. Patient A&Ox4. no s/s of distress or pain. Patient pending COVID testing results, pending placement. Patient ambulatory with steady gait. Patient in understanding of plan of care. Patient with no further questions for the RN. Resting in comfort. Call bell within reach and encouraged to use when assistance needed. Will continue to monitor.

## 2020-05-13 NOTE — ED CDU PROVIDER SUBSEQUENT DAY NOTE - PROGRESS NOTE DETAILS
Pt received from ZOË Barnes. Pending COVID result for placement.   Pt evaluated at bedside, no acute complaints at this time. Pt reports he has enough of Rx metformin. PE: AOx3, well appearing, non toxic, MMM. PERRL. Neck supple. +S1/S2, peripheral pulse 2+ b/l. Lungs CTA b/l. Abd soft NTND no rebound/guarding no CVAT. Moves all extremities spontaneously/symmetrically.   COVID swab is negative. SW to arrange housing. Reviewed all results and plan with Dr. Paniagua. Pt stable for d/c, VSS, tolerating PO, ambulatory.  Discussion includes results, plan, proper medication use/side effects, and return precautions. Pt advised to f/u with PMD 1-2 days and specialists discussed.  Pt given printed copies of lab/radiology for outpt follow up. Pt verbalized understanding/agreement of plan.

## 2020-05-13 NOTE — ED ADULT NURSE REASSESSMENT NOTE - NS ED NURSE REASSESS COMMENT FT1
Pt received from ED RN A&OX4. No acute distress noted. Pt denies chest pain,sob,dizziness,headache,cough and diaphoresis. Pending Covid Result for placement purposes. POC reviewed. Low suspicion for Covid as per ZOË Barnes. Call palumbo within reach. Safety maintained. Oriented to unit. Will continue to monitor. Pt received from ED RN A&OX4. No acute distress noted. Pt denies chest pain,sob,dizziness,headache,cough and diaphoresis. Pending Covid Result for placement purposes. POC reviewed. Low suspicion for Covid as per ZOË Barnes. Pt refusing to have IV placement at this time. Education provided in case of an emergency situation. Call bell within reach. Safety maintained. Oriented to unit. Will continue to monitor.

## 2020-05-13 NOTE — ED ADULT NURSE REASSESSMENT NOTE - NS ED NURSE REASSESS COMMENT FT1
Pt received from outgoing RN asleep and in stable condition. Pt skin is warm to the touch, and he breathes spontaneous on room air. Pt is homeless and pending  to arrange emergency housing in the morning. Safety maintained. Will continue to monitor.

## 2020-05-13 NOTE — ED CDU PROVIDER DISPOSITION NOTE - PATIENT PORTAL LINK FT
You can access the FollowMyHealth Patient Portal offered by [facility] by registering at the following website: [email or web address]. By joining [email or web address]’s FollowMyHealth portal, you will also be able to view your health information using other applications (apps) compatible with our system.

## 2020-05-13 NOTE — ED ADULT NURSE REASSESSMENT NOTE - COMFORT CARE
po fluids offered/plan of care explained/ambulated to bathroom/meal provided/wait time explained/repositioned
ambulated to bathroom/plan of care explained/po fluids offered/side rails down

## 2024-05-22 NOTE — ED ADULT NURSE REASSESSMENT NOTE - STATUS
Covid result pending. Awaiting placement.
awaiting consult/consult
covid results pending/awaiting consult
social/awaiting consult
Statement Selected

## 2024-09-03 NOTE — ED CDU PROVIDER SUBSEQUENT DAY NOTE - CONSTITUTIONAL, MLM
normal... Well appearing, awake, alert, oriented to person, place, time/situation and in no apparent distress. Breath sounds clear and equal bilaterally.

## 2024-09-12 NOTE — ED ADULT NURSE NOTE - CAS EDN INTEG ASSESS
History of dyslipidemia and currently well controlled on Crestor 20 mg daily LDL cholesterol is below 60 maintain diet control and daily physical activity.   - - -